# Patient Record
Sex: FEMALE | Race: WHITE | Employment: STUDENT | URBAN - METROPOLITAN AREA
[De-identification: names, ages, dates, MRNs, and addresses within clinical notes are randomized per-mention and may not be internally consistent; named-entity substitution may affect disease eponyms.]

---

## 2022-06-23 ENCOUNTER — OFFICE VISIT (OUTPATIENT)
Dept: PHYSICAL MEDICINE AND REHAB | Facility: CLINIC | Age: 22
End: 2022-06-23
Payer: COMMERCIAL

## 2022-06-23 VITALS
SYSTOLIC BLOOD PRESSURE: 110 MMHG | OXYGEN SATURATION: 99 % | BODY MASS INDEX: 17.11 KG/M2 | HEIGHT: 62 IN | WEIGHT: 93 LBS | DIASTOLIC BLOOD PRESSURE: 80 MMHG | HEART RATE: 115 BPM

## 2022-06-23 DIAGNOSIS — G89.29 CHRONIC JAW PAIN: ICD-10-CM

## 2022-06-23 DIAGNOSIS — M26.621 TMJ TENDERNESS, RIGHT: ICD-10-CM

## 2022-06-23 DIAGNOSIS — G44.86 CERVICOGENIC HEADACHE: ICD-10-CM

## 2022-06-23 DIAGNOSIS — M41.25 OTHER IDIOPATHIC SCOLIOSIS, THORACOLUMBAR REGION: ICD-10-CM

## 2022-06-23 DIAGNOSIS — M35.9 CONNECTIVE TISSUE DISORDER (HCC): ICD-10-CM

## 2022-06-23 DIAGNOSIS — M54.2 NECK PAIN ON RIGHT SIDE: Primary | ICD-10-CM

## 2022-06-23 DIAGNOSIS — M54.12 CERVICAL RADICULOPATHY: ICD-10-CM

## 2022-06-23 DIAGNOSIS — R68.84 CHRONIC JAW PAIN: ICD-10-CM

## 2022-06-23 PROCEDURE — 99204 OFFICE O/P NEW MOD 45 MIN: CPT | Performed by: PHYSICAL MEDICINE & REHABILITATION

## 2022-06-23 PROCEDURE — 3008F BODY MASS INDEX DOCD: CPT | Performed by: PHYSICAL MEDICINE & REHABILITATION

## 2022-06-23 PROCEDURE — 3079F DIAST BP 80-89 MM HG: CPT | Performed by: PHYSICAL MEDICINE & REHABILITATION

## 2022-06-23 PROCEDURE — 3074F SYST BP LT 130 MM HG: CPT | Performed by: PHYSICAL MEDICINE & REHABILITATION

## 2022-06-23 RX ORDER — ONDANSETRON 4 MG/1
TABLET, FILM COATED ORAL
COMMUNITY
Start: 2022-01-06

## 2022-06-23 RX ORDER — CLONAZEPAM 0.5 MG/1
TABLET ORAL
COMMUNITY
Start: 2022-05-22

## 2022-06-23 RX ORDER — SODIUM FLUORIDE 6 MG/ML
PASTE, DENTIFRICE DENTAL
COMMUNITY
Start: 2022-05-05

## 2022-06-23 NOTE — PATIENT INSTRUCTIONS
Plan  She will start PT on the cervical spine. She will drop off the copy of the cervical spine x-rays. She will get a MRI of the cervical spine. She will undergo testing for EDS. She will follow up after doing one month of the PT and getting the MRI scan. She will take Aleve or Ibuprofen as needed for the pain.

## 2022-06-24 ENCOUNTER — LAB ENCOUNTER (OUTPATIENT)
Dept: LAB | Facility: REFERENCE LAB | Age: 22
End: 2022-06-24
Attending: PHYSICAL MEDICINE & REHABILITATION
Payer: COMMERCIAL

## 2022-06-24 DIAGNOSIS — M35.9 CONNECTIVE TISSUE DISORDER (HCC): ICD-10-CM

## 2022-06-24 DIAGNOSIS — G89.29 CHRONIC JAW PAIN: ICD-10-CM

## 2022-06-24 DIAGNOSIS — R68.84 CHRONIC JAW PAIN: ICD-10-CM

## 2022-06-24 DIAGNOSIS — M41.25 OTHER IDIOPATHIC SCOLIOSIS, THORACOLUMBAR REGION: ICD-10-CM

## 2022-06-24 DIAGNOSIS — M54.12 CERVICAL RADICULOPATHY: ICD-10-CM

## 2022-06-24 DIAGNOSIS — G44.86 CERVICOGENIC HEADACHE: ICD-10-CM

## 2022-06-24 DIAGNOSIS — M54.2 NECK PAIN ON RIGHT SIDE: ICD-10-CM

## 2022-06-24 DIAGNOSIS — M26.621 TMJ TENDERNESS, RIGHT: ICD-10-CM

## 2022-06-24 PROCEDURE — 82523 COLLAGEN CROSSLINKS: CPT

## 2022-06-27 ENCOUNTER — TELEPHONE (OUTPATIENT)
Dept: PHYSICAL MEDICINE AND REHAB | Facility: CLINIC | Age: 22
End: 2022-06-27

## 2022-06-28 ENCOUNTER — MED REC SCAN ONLY (OUTPATIENT)
Dept: PHYSICAL MEDICINE AND REHAB | Facility: CLINIC | Age: 22
End: 2022-06-28

## 2022-06-28 NOTE — TELEPHONE ENCOUNTER
Completed form faxed to [phone] at this time along with electronic packing list.   Updated Greer Amaro via Epic chat. Will send copy of order to scanning at this time.

## 2022-07-19 ENCOUNTER — TELEPHONE (OUTPATIENT)
Dept: PHYSICAL MEDICINE AND REHAB | Facility: CLINIC | Age: 22
End: 2022-07-19

## 2022-07-19 NOTE — TELEPHONE ENCOUNTER
Kassi Bartlett from [facility] is calling in requesting MRI order and most recent office note.    Patient is scheduled for an MRI Thursday 7/21  [address]

## 2022-07-19 NOTE — TELEPHONE ENCOUNTER
Spoke with patient and let her know [facility] is requesting MRI order and last office note. [id number] Gloria Uribe with patient to fax last office note. Order and last office note faxed.

## 2022-07-25 ENCOUNTER — TELEPHONE (OUTPATIENT)
Dept: PHYSICAL MEDICINE AND REHAB | Facility: CLINIC | Age: 22
End: 2022-07-25

## 2022-07-27 ENCOUNTER — TELEPHONE (OUTPATIENT)
Dept: NEUROLOGY | Facility: CLINIC | Age: 22
End: 2022-07-27

## 2022-07-27 NOTE — TELEPHONE ENCOUNTER
Patient came and provide X-Ray report and 2 CD'S containing MRI Results, Placed in Sera Uribe's folder by nurse brennan.

## 2022-07-27 NOTE — TELEPHONE ENCOUNTER
Radiologist reports placed in Dr. Jose Urbano bin for review. CERVICAL MRI and XR uploaded to PACS by this RN for Dr. Jose Urbano review at this time. Will route encounter to Dr. Naoma Dance so he may review images.

## 2022-08-05 PROBLEM — M50.90 CERVICAL DISC DISEASE: Status: ACTIVE | Noted: 2022-08-05

## 2022-08-05 PROBLEM — M43.10 RETROLISTHESIS: Status: ACTIVE | Noted: 2022-08-05

## 2022-08-11 ENCOUNTER — PATIENT MESSAGE (OUTPATIENT)
Dept: PHYSICAL MEDICINE AND REHAB | Facility: CLINIC | Age: 22
End: 2022-08-11

## 2022-08-12 ENCOUNTER — MED REC SCAN ONLY (OUTPATIENT)
Dept: PHYSICAL MEDICINE AND REHAB | Facility: CLINIC | Age: 22
End: 2022-08-12

## 2022-08-12 ENCOUNTER — PATIENT MESSAGE (OUTPATIENT)
Dept: PHYSICAL MEDICINE AND REHAB | Facility: CLINIC | Age: 22
End: 2022-08-12

## 2022-08-12 NOTE — TELEPHONE ENCOUNTER
From: Nicole Garcia  To: Cora Grissom  Sent: 8/10/2022 3:59 PM CDT  Subject: [facility] PT location recommendations    Hi Dr.Couri Ary's recommendations for the PT locations were: \"Physiotherapy associates in [facility] AND [facility] on [address]. \"    Physiotherapy associates in [facility]  [address] [facility], [address] [place] [address],Fourth Floor  Phone: [phone]  Fax: [phone] [address]  Phone: [phone]    Please let me know which one you would like to go to and I will fax the PT order over to that specific location.     Thank you,  Becca Millard RN

## 2022-08-15 NOTE — TELEPHONE ENCOUNTER
From: Monica Romero  To: Miriam Cullen MD  Sent: 8/12/2022 7:51 PM CDT  Subject: PT Recommendations in [facility]! Thank you for looking at my MRI. I have not started physical therapy yet, but I do have your referrals for ones in [facility]. I was wondering if you would also have any physical therapy referrals located in [address] [place] [address]? That is where my family is located, so doing treatment there may be more convenient for me.     With appreciation,  Amanda Flroes

## 2022-08-22 ENCOUNTER — PATIENT MESSAGE (OUTPATIENT)
Dept: PHYSICAL MEDICINE AND REHAB | Facility: CLINIC | Age: 22
End: 2022-08-22

## 2022-08-22 NOTE — TELEPHONE ENCOUNTER
PT order faxed to [address] [place] [address] in [place], [place] per patient request.  Message sent to patient via [employer].

## 2022-08-22 NOTE — TELEPHONE ENCOUNTER
From: Parish Pollard  Sent: 8/22/2022 3:18 PM CDT  To: Jenny Mooney Physiatry Nurse  Subject: [facility] PT location recommendations    Hello! I've decided to start physical therapy at [facility] in [place], [place]. Their fax number is [phone]. Would you mind faxing my PT order over them?      Thank you so much,  [employer]

## 2022-09-23 ENCOUNTER — MED REC SCAN ONLY (OUTPATIENT)
Dept: PHYSICAL MEDICINE AND REHAB | Facility: CLINIC | Age: 22
End: 2022-09-23

## 2022-09-26 ENCOUNTER — PATIENT MESSAGE (OUTPATIENT)
Dept: PHYSICAL MEDICINE AND REHAB | Facility: CLINIC | Age: 22
End: 2022-09-26

## 2022-09-26 ENCOUNTER — TELEPHONE (OUTPATIENT)
Dept: PHYSICAL MEDICINE AND REHAB | Facility: CLINIC | Age: 22
End: 2022-09-26

## 2022-09-26 ENCOUNTER — OFFICE VISIT (OUTPATIENT)
Dept: PHYSICAL MEDICINE AND REHAB | Facility: CLINIC | Age: 22
End: 2022-09-26

## 2022-09-26 VITALS
SYSTOLIC BLOOD PRESSURE: 90 MMHG | HEIGHT: 62 IN | WEIGHT: 94.63 LBS | OXYGEN SATURATION: 100 % | BODY MASS INDEX: 17.41 KG/M2 | HEART RATE: 77 BPM | DIASTOLIC BLOOD PRESSURE: 72 MMHG

## 2022-09-26 DIAGNOSIS — M54.2 NECK PAIN ON RIGHT SIDE: ICD-10-CM

## 2022-09-26 DIAGNOSIS — G89.29 CHRONIC JAW PAIN: ICD-10-CM

## 2022-09-26 DIAGNOSIS — M43.10 RETROLISTHESIS: ICD-10-CM

## 2022-09-26 DIAGNOSIS — G44.86 CERVICOGENIC HEADACHE: ICD-10-CM

## 2022-09-26 DIAGNOSIS — Q79.60 EDS (EHLERS-DANLOS SYNDROME): ICD-10-CM

## 2022-09-26 DIAGNOSIS — G81.94 LEFT HEMIPARESIS (HCC): ICD-10-CM

## 2022-09-26 DIAGNOSIS — M41.25 OTHER IDIOPATHIC SCOLIOSIS, THORACOLUMBAR REGION: ICD-10-CM

## 2022-09-26 DIAGNOSIS — R68.84 CHRONIC JAW PAIN: ICD-10-CM

## 2022-09-26 DIAGNOSIS — M50.90 CERVICAL DISC DISEASE: ICD-10-CM

## 2022-09-26 DIAGNOSIS — M26.621 TMJ TENDERNESS, RIGHT: ICD-10-CM

## 2022-09-26 DIAGNOSIS — M35.9 CONNECTIVE TISSUE DISORDER (HCC): ICD-10-CM

## 2022-09-26 DIAGNOSIS — M54.12 CERVICAL RADICULOPATHY: Primary | ICD-10-CM

## 2022-09-26 NOTE — PATIENT INSTRUCTIONS
Plan  She will get a MRI of the brain. She will continue with the PT and her home exercise program.    She will see a cardiologist for the EDS. She will need to see an ophthalmologist if she has any eye issues. She will follow up after having the MRI of the brain.

## 2022-09-26 NOTE — TELEPHONE ENCOUNTER
----- Message from Nohemi Acevedo MD sent at 9/23/2022  5:59 PM CDT -----  She has EDS. Because of this, she will need to see a cardiologist so that she can be followed for possible vascular issues. Please give her Dr. Mendes Westhoff information.

## 2022-12-05 ENCOUNTER — TELEPHONE (OUTPATIENT)
Dept: PHYSICAL MEDICINE AND REHAB | Facility: CLINIC | Age: 22
End: 2022-12-05

## 2022-12-05 NOTE — TELEPHONE ENCOUNTER
MRI Brain scanned into PACS. Disc mailed to address patient provided on drop-off form.      [facility] [address] [facility]

## 2023-02-15 ENCOUNTER — TELEPHONE (OUTPATIENT)
Dept: PHYSICAL MEDICINE AND REHAB | Facility: CLINIC | Age: 23
End: 2023-02-15

## 2023-02-20 ENCOUNTER — MED REC SCAN ONLY (OUTPATIENT)
Dept: PHYSICAL MEDICINE AND REHAB | Facility: CLINIC | Age: 23
End: 2023-02-20

## 2023-02-20 ENCOUNTER — TELEPHONE (OUTPATIENT)
Dept: PHYSICAL MEDICINE AND REHAB | Facility: CLINIC | Age: 23
End: 2023-02-20

## 2023-02-20 NOTE — TELEPHONE ENCOUNTER
Spoke with patient who stated she has been in PT and she already dropped the CD off of the MRI. Reviewed TE 12/5/22 and there was an issue with the images not pulling up. F/u appt scheduled for 2/28/23. Patient stated she will bring CD in to f/u appt. Patient was appreciative. Nothing further needed at this time.
